# Patient Record
Sex: FEMALE | Race: WHITE | Employment: FULL TIME | URBAN - NONMETROPOLITAN AREA
[De-identification: names, ages, dates, MRNs, and addresses within clinical notes are randomized per-mention and may not be internally consistent; named-entity substitution may affect disease eponyms.]

---

## 2023-04-27 ENCOUNTER — HOSPITAL ENCOUNTER (EMERGENCY)
Age: 23
Discharge: HOME | End: 2023-04-27
Payer: COMMERCIAL

## 2023-04-27 VITALS
DIASTOLIC BLOOD PRESSURE: 74 MMHG | TEMPERATURE: 96.4 F | SYSTOLIC BLOOD PRESSURE: 121 MMHG | RESPIRATION RATE: 18 BRPM | OXYGEN SATURATION: 95 % | HEART RATE: 90 BPM

## 2023-04-27 DIAGNOSIS — M77.52 TENDONITIS OF ANKLE, LEFT: Primary | ICD-10-CM

## 2023-04-27 PROCEDURE — 99203 OFFICE O/P NEW LOW 30 MIN: CPT

## 2023-04-27 PROCEDURE — 99203 OFFICE O/P NEW LOW 30 MIN: CPT | Performed by: NURSE PRACTITIONER

## 2023-04-27 RX ORDER — PREDNISONE 20 MG/1
20 TABLET ORAL DAILY
Qty: 5 TABLET | Refills: 0 | Status: SHIPPED | OUTPATIENT
Start: 2023-04-27 | End: 2023-05-02

## 2023-04-27 RX ORDER — NORGESTIMATE AND ETHINYL ESTRADIOL 0.25-0.035
1 KIT ORAL DAILY
COMMUNITY

## 2023-04-27 ASSESSMENT — ENCOUNTER SYMPTOMS
VOMITING: 0
COUGH: 0
EYE PAIN: 0
RHINORRHEA: 0
ABDOMINAL PAIN: 0
SHORTNESS OF BREATH: 0
BLOOD IN STOOL: 0
NAUSEA: 0
WHEEZING: 0
DIARRHEA: 0
CONSTIPATION: 0

## 2023-04-27 ASSESSMENT — PAIN - FUNCTIONAL ASSESSMENT: PAIN_FUNCTIONAL_ASSESSMENT: 0-10

## 2023-04-27 ASSESSMENT — PAIN DESCRIPTION - ORIENTATION: ORIENTATION: LEFT;POSTERIOR

## 2023-04-27 ASSESSMENT — PAIN SCALES - GENERAL: PAINLEVEL_OUTOF10: 6

## 2023-04-27 ASSESSMENT — PAIN DESCRIPTION - LOCATION: LOCATION: FOOT

## 2023-04-27 NOTE — ED PROVIDER NOTES
[id number]       Chief Complaint   Patient presents with    Tendonitis    Foot Problem        Nurses Notes reviewed and I agree except as noted in the HPI. HISTORY OF PRESENT ILLNESS   Gloria Alves is a 23 y.o. female who presents to urgent care with complaint of left ankle pain and swelling that started while she was spending a significant amount of time on her feet 2 weeks ago. 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 She states that she was walking today down some steps and she felt a sharp pain in her Achilles tendon. She states that she is able to walk, but this does elicit some pain. Patient denies any injury or fall. Patient denies numbness or tingling. Patient is able to move the ankle and toes without difficulty. Dorsalis pedis and posterior tibialis pulses are 2+. Cap refill is less then 2 seconds at the nailbed. Sensation is intact to the toes. REVIEW OF SYSTEMS     Review of Systems   Constitutional:  Negative for appetite change, chills, fatigue, fever and unexpected weight change. HENT:  Negative for ear pain and rhinorrhea. Eyes:  Negative for pain and visual disturbance. Respiratory:  Negative for cough, shortness of breath and wheezing. Cardiovascular:  Negative for chest pain, palpitations and leg swelling. Gastrointestinal:  Negative for abdominal pain, blood in stool, constipation, diarrhea, nausea and vomiting. Genitourinary:  Negative for dysuria, frequency and hematuria. Musculoskeletal:  Positive for arthralgias (left ankle pain). Negative for joint swelling and neck stiffness. Skin:  Negative for rash. Neurological:  Negative for dizziness, syncope, weakness, light-headedness and headaches. Hematological:  Does not bruise/bleed easily.      PAST

## 2023-04-27 NOTE — ED TRIAGE NOTES
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

## 2024-02-07 ENCOUNTER — HOSPITAL ENCOUNTER (EMERGENCY)
Age: 24
Discharge: HOME | End: 2024-02-07
Payer: COMMERCIAL

## 2024-02-07 VITALS
TEMPERATURE: 98.9 F | DIASTOLIC BLOOD PRESSURE: 79 MMHG | RESPIRATION RATE: 22 BRPM | SYSTOLIC BLOOD PRESSURE: 114 MMHG | OXYGEN SATURATION: 99 % | HEART RATE: 94 BPM

## 2024-02-07 DIAGNOSIS — J10.1 INFLUENZA A: Primary | ICD-10-CM

## 2024-02-07 LAB
FLUAV AG SPEC QL: POSITIVE
FLUBV AG SPEC QL: NEGATIVE
S PYO AG THROAT QL: NEGATIVE

## 2024-02-07 PROCEDURE — 99213 OFFICE O/P EST LOW 20 MIN: CPT

## 2024-02-07 PROCEDURE — 99213 OFFICE O/P EST LOW 20 MIN: CPT | Performed by: NURSE PRACTITIONER

## 2024-02-07 PROCEDURE — 87804 INFLUENZA ASSAY W/OPTIC: CPT

## 2024-02-07 PROCEDURE — 87651 STREP A DNA AMP PROBE: CPT

## 2024-02-07 RX ORDER — BROMPHENIRAMINE MALEATE, PSEUDOEPHEDRINE HYDROCHLORIDE, AND DEXTROMETHORPHAN HYDROBROMIDE 2; 30; 10 MG/5ML; MG/5ML; MG/5ML
10 SYRUP ORAL 4 TIMES DAILY PRN
Qty: 200 ML | Refills: 0 | Status: SHIPPED | OUTPATIENT
Start: 2024-02-07

## 2024-02-07 ASSESSMENT — ENCOUNTER SYMPTOMS
EYES NEGATIVE: 1
SORE THROAT: 1
COUGH: 1

## 2024-02-07 ASSESSMENT — PAIN - FUNCTIONAL ASSESSMENT: PAIN_FUNCTIONAL_ASSESSMENT: NONE - DENIES PAIN

## 2024-02-07 NOTE — ED TRIAGE NOTES
Has been going on for about a month cough, runny nose/congestion, , and Sunday woke with sore throat, sinus congestion, tired, will need a work note

## 2024-02-07 NOTE — ED PROVIDER NOTES
[facility] URGENT CARE  UrgentCare Encounter      CHIEFCOMPLAINT     No chief complaint on file.      Nurses Notes reviewed and I agree except as noted in the HPI.  HISTORY OF PRESENT ILLNESS   Preethi Bates is a 23 y.o. female who presents to urgent care with complaints of fever, body aches, chills, cough.  Symptom onset was Sunday evening.    REVIEW OF SYSTEMS     Review of Systems   Constitutional:  Positive for chills, fatigue and fever.   HENT:  Positive for congestion and sore throat.    Eyes: Negative.    Respiratory:  Positive for cough.    Cardiovascular:  Negative for chest pain.   Musculoskeletal:  Positive for myalgias.       PAST MEDICAL HISTORY   History reviewed. No pertinent past medical history.    SURGICAL HISTORY     Patient  has a past surgical history that includes Ankle surgery and Myringotomy w/ tubes.    CURRENT MEDICATIONS       Previous Medications    NORGESTIMATE-ETHINYL ESTRADIOL (SPRINTEC 28) 0.25-35 MG-MCG PER TABLET    Take 1 tablet by mouth daily       ALLERGIES     Patient is has No Known Allergies.    FAMILY HISTORY     Patient'sfamily history is not on file.    SOCIAL HISTORY     Patient  reports that she has never smoked. She has never been exposed to tobacco smoke. She has never used smokeless tobacco. She reports current alcohol use.    PHYSICAL EXAM     ED TRIAGE VITALS  BP: 114/79, Temp: 98.9 °F (37.2 °C), Pulse: 94, Respirations: 22, SpO2: 99 %  Physical Exam  Vitals and nursing note reviewed.   Constitutional:       General: She is not in acute distress.     Appearance: Normal appearance. She is not ill-appearing or toxic-appearing.   HENT:      Head: Normocephalic and atraumatic.      Nose: No congestion or rhinorrhea.      Mouth/Throat:      Mouth: Mucous membranes are moist.      Pharynx: Oropharynx is clear.   Eyes:      Extraocular Movements: Extraocular movements intact.      Conjunctiva/sclera: Conjunctivae normal.      Pupils: Pupils are equal, round,

## 2024-02-07 NOTE — DISCHARGE INSTRUCTIONS
Continue acetaminophen and ibuprofen as needed for any fever, body aches, chills.  Rest.  Encourage oral fluid intake including water, gatorade, pedialyte.  Drink frequent small quantities.

## 2024-02-25 ENCOUNTER — HOSPITAL ENCOUNTER (EMERGENCY)
Age: 24
Discharge: HOME | End: 2024-02-25
Payer: COMMERCIAL

## 2024-02-25 VITALS
HEART RATE: 100 BPM | SYSTOLIC BLOOD PRESSURE: 124 MMHG | RESPIRATION RATE: 16 BRPM | TEMPERATURE: 97.8 F | DIASTOLIC BLOOD PRESSURE: 73 MMHG | OXYGEN SATURATION: 97 %

## 2024-02-25 DIAGNOSIS — N75.0 BARTHOLIN CYST: Primary | ICD-10-CM

## 2024-02-25 PROCEDURE — 99213 OFFICE O/P EST LOW 20 MIN: CPT

## 2024-02-25 RX ORDER — AMOXICILLIN AND CLAVULANATE POTASSIUM 875; 125 MG/1; MG/1
1 TABLET, FILM COATED ORAL 2 TIMES DAILY
Qty: 14 TABLET | Refills: 0 | Status: SHIPPED | OUTPATIENT
Start: 2024-02-25 | End: 2024-03-03

## 2024-02-25 RX ORDER — DOXYCYCLINE HYCLATE 100 MG
100 TABLET ORAL 2 TIMES DAILY
Qty: 14 TABLET | Refills: 0 | Status: SHIPPED | OUTPATIENT
Start: 2024-02-25 | End: 2024-03-03

## 2024-02-25 ASSESSMENT — PAIN DESCRIPTION - DESCRIPTORS: DESCRIPTORS: SHARP

## 2024-02-25 ASSESSMENT — PAIN DESCRIPTION - PAIN TYPE: TYPE: ACUTE PAIN

## 2024-02-25 ASSESSMENT — PAIN DESCRIPTION - FREQUENCY: FREQUENCY: CONTINUOUS

## 2024-02-25 ASSESSMENT — PAIN - FUNCTIONAL ASSESSMENT: PAIN_FUNCTIONAL_ASSESSMENT: 0-10

## 2024-02-25 ASSESSMENT — PAIN DESCRIPTION - LOCATION: LOCATION: VULVA

## 2024-02-25 ASSESSMENT — PAIN SCALES - GENERAL: PAINLEVEL_OUTOF10: 6

## 2024-02-25 NOTE — ED PROVIDER NOTES
[facility] URGENT CARE  Urgent Care Encounter      CHIEF COMPLAINT       Chief Complaint   Patient presents with    Abscess     Abscess to  left labia- started as a \"pimple\"  Wednesday and now the  entire labia is  swollen and painful       Nurses Notes reviewed and I agree except as noted in the HPI.  HISTORY OF PRESENT ILLNESS   Preethi Bates is a 23 y.o. female who presents to urgent care with complaints of abscess to left labia.  Patient reports she first noticed symptoms 4 days ago.  Patient reports it started out as a pimple and now her entire left labia is swollen and very painful.  Patient reports she has been taking Epsom salt baths and taking ibuprofen every 6 hours.  Patient reports she does not have an OB/GYN currently.  Patient denies fevers, denies chances of STI.  Denies pregnancy.    REVIEW OF SYSTEMS     Review of Systems   Constitutional:  Negative for fatigue and fever.   HENT:  Negative for congestion, sinus pain and sore throat.    Respiratory:  Negative for cough and shortness of breath.    Cardiovascular:  Negative for chest pain.   Gastrointestinal:  Negative for abdominal pain.   Genitourinary:  Positive for genital sores. Negative for dysuria and flank pain.   Skin:  Positive for wound.   Neurological:  Negative for dizziness and seizures.       PAST MEDICAL HISTORY   History reviewed. No pertinent past medical history.    SURGICAL HISTORY     Patient  has a past surgical history that includes Ankle surgery and Myringotomy w/ tubes.    CURRENT MEDICATIONS       Discharge Medication List as of 2/25/2024 11:42 AM        CONTINUE these medications which have NOT CHANGED    Details   brompheniramine-pseudoephedrine-DM 2-30-10 MG/5ML syrup Take 10 mLs by mouth 4 times daily as needed for Congestion or Cough, Disp-200 mL, R-0Normal      norgestimate-ethinyl estradiol (SPRINTEC 28) 0.25-35 MG-MCG per tablet Take 1 tablet by mouth dailyHistorical Med             ALLERGIES     Patient

## 2024-10-11 ENCOUNTER — HOSPITAL ENCOUNTER (EMERGENCY)
Age: 24
Discharge: HOME | End: 2024-10-11
Attending: EMERGENCY MEDICINE
Payer: COMMERCIAL

## 2024-10-11 VITALS
OXYGEN SATURATION: 98 % | HEART RATE: 110 BPM | RESPIRATION RATE: 16 BRPM | TEMPERATURE: 98.3 F | SYSTOLIC BLOOD PRESSURE: 122 MMHG | DIASTOLIC BLOOD PRESSURE: 90 MMHG

## 2024-10-11 DIAGNOSIS — F41.1 ANXIETY STATE: Primary | ICD-10-CM

## 2024-10-11 PROCEDURE — 99283 EMERGENCY DEPT VISIT LOW MDM: CPT

## 2024-10-11 RX ORDER — HYDROXYZINE HYDROCHLORIDE 25 MG/1
25 TABLET, FILM COATED ORAL EVERY 8 HOURS PRN
Qty: 15 TABLET | Refills: 0 | Status: SHIPPED | OUTPATIENT
Start: 2024-10-11 | End: 2024-10-16 | Stop reason: SDUPTHER

## 2024-10-11 ASSESSMENT — PATIENT HEALTH QUESTIONNAIRE - PHQ9
SUM OF ALL RESPONSES TO PHQ QUESTIONS 1-9: 19
1. LITTLE INTEREST OR PLEASURE IN DOING THINGS: MORE THAN HALF THE DAYS
SUM OF ALL RESPONSES TO PHQ QUESTIONS 1-9: 20
SUM OF ALL RESPONSES TO PHQ9 QUESTIONS 1 & 2: 4
6. FEELING BAD ABOUT YOURSELF - OR THAT YOU ARE A FAILURE OR HAVE LET YOURSELF OR YOUR FAMILY DOWN: MORE THAN HALF THE DAYS
9. THOUGHTS THAT YOU WOULD BE BETTER OFF DEAD, OR OF HURTING YOURSELF: SEVERAL DAYS
SUM OF ALL RESPONSES TO PHQ QUESTIONS 1-9: 20
SUM OF ALL RESPONSES TO PHQ QUESTIONS 1-9: 20
3. TROUBLE FALLING OR STAYING ASLEEP: NEARLY EVERY DAY
5. POOR APPETITE OR OVEREATING: MORE THAN HALF THE DAYS
10. IF YOU CHECKED OFF ANY PROBLEMS, HOW DIFFICULT HAVE THESE PROBLEMS MADE IT FOR YOU TO DO YOUR WORK, TAKE CARE OF THINGS AT HOME, OR GET ALONG WITH OTHER PEOPLE: EXTREMELY DIFFICULT
8. MOVING OR SPEAKING SO SLOWLY THAT OTHER PEOPLE COULD HAVE NOTICED. OR THE OPPOSITE, BEING SO FIGETY OR RESTLESS THAT YOU HAVE BEEN MOVING AROUND A LOT MORE THAN USUAL: NEARLY EVERY DAY
4. FEELING TIRED OR HAVING LITTLE ENERGY: NEARLY EVERY DAY
7. TROUBLE CONCENTRATING ON THINGS, SUCH AS READING THE NEWSPAPER OR WATCHING TELEVISION: MORE THAN HALF THE DAYS
2. FEELING DOWN, DEPRESSED OR HOPELESS: MORE THAN HALF THE DAYS

## 2024-10-11 ASSESSMENT — LIFESTYLE VARIABLES
HOW OFTEN DO YOU HAVE A DRINK CONTAINING ALCOHOL: NEVER
HOW MANY STANDARD DRINKS CONTAINING ALCOHOL DO YOU HAVE ON A TYPICAL DAY: PATIENT DOES NOT DRINK

## 2024-10-11 ASSESSMENT — SLEEP AND FATIGUE QUESTIONNAIRES
AVERAGE NUMBER OF SLEEP HOURS: 4
DO YOU USE A SLEEP AID: NO
SLEEP PATTERN: DIFFICULTY FALLING ASLEEP
DO YOU HAVE DIFFICULTY SLEEPING: YES

## 2024-10-12 NOTE — ED PROVIDER NOTES
[facility]  EMERGENCY DEPARTMENT ENCOUNTER          Pt Name: Preethi Bates  MRN: [id number]  Birthdate 2000  Date of evaluation: 10/11/2024    CHIEF COMPLAINT     No chief complaint on file.      Nurses Notes reviewed and I agree except as noted in the HPI.    HISTORY OF PRESENT ILLNESS    Preethi Bates is a 24 y.o. pleasant female who presents to the emergency department for evaluation of anxiety, depression.  Patient reports that she has had anxiety and depression since she was 16 years of age.  She has never been formally diagnosed but states that she has struggled with this for years.  She reports having \"a bad episode tonight in my room, I was thinking about things, I felt helpless and lost the will to live for a second\".  Denies any specific plan.  She reports that she has been feeling as though she has lost the will to live about once a month ever since April when she broke up with her boyfriend and started to experience some financial difficulties related to her college because.  Denies any past history of suicide attempts or cutting behavior.  Over the summer she was seeking counseling through [facility] which she reports helped her slightly but she did not continue with the counseling.  She denies current suicidal thoughts.  No homicidal ideation.  No auditory or visual hallucinations.  She uses marijuana 3 times a week.  Denies any current alcohol use.  She is a student at [employer].  She did have to stop her classes in April due to finances, has restarted her coursework but her grades are not doing well per her report currently.  The patient has no other acute complaints at this time.        REVIEW OF SYSTEMS   Review of Systems    PAST MEDICAL AND SURGICAL HISTORY   No past medical history on file.  Past Surgical History:   Procedure Laterality Date    ANKLE SURGERY      MYRINGOTOMY W/ TUBES         CURRENT MEDICATIONS   No current facility-administered medications for    Pertinent previous records reviewed:  previous notes, admissions and hospitalizations .  Code Status: Not addressed at time of initial evaluation and reviewed.    DIAGNOSTIC RESULTS       RADIOLOGY: non-plain film images(s) such as CT,Ultrasound and MRI are read by the radiologist.    No orders to display     [] Visualized and interpreted by me   [] Radiologist's Wet Read Report Reviewed   [] Discussed withRadiologist.    LABS:   Labs Reviewed - No data to display    (Any cultures that may have been sent were not resulted at the time of this patient visit)    ED Medications administered this visit: Medications - No data to display      ED COURSE        Available laboratory and imaging results were independently reviewed and clinically correlated.    CRITICAL CARE:   None    CONSULTS:  LURDES: Gabo assessed patient, no criteria for inpatient. Offered patient Mathieu assessment for CSU but she declines and prefers to pursue outpatient follow up with counseling/mental health services.    PROCEDURES:  None    Shared decision making was performed with the patient and/or present family.   Goals of care were discussed with patient and/or present family.      The results of pertinent diagnostic studies and exam findings were discussed. The patient’s provisional diagnosis and plan of care were discussed with the patient and present family. The patient and/or present family expressed understanding of the diagnosis and plan.     The nurse was instructed to provide written instructions and appropriate follow-up information. The patient understands their need and responsibility to obtain additional follow-up as instructed. The patient is comfortable with the plan and discharge. The risks of medications administered and prescribed were discussed with the patient and family present.      MEDICATION CHANGES     Discharge Medication List as of 10/11/2024 11:33 PM        START taking these medications    Details   hydrOXYzine HCl

## 2024-10-12 NOTE — ED NOTES
Patient presents to ED with chief complaint of Anxiety and depression. Patient states that she has been struggling with these symptoms since she was 16, but recently she went through a tough break up. Patient is also struggling to pay for college so this has contributed to the anxiety as well. Patient states that she has had thoughts of hurting herself, but states they are just thoughts, and she has no plan. Patient resting in bed. Respirations easy and unlabored. No distress noted. Call light within reach.

## 2024-10-12 NOTE — PROGRESS NOTES
[facility] CRISIS ASSESSMENT    SITUATION  Chief Complaint per ED Provider or Assigned Nurse report:   Depression, anxiety, thoughts of hurting self     Chief Complaint per Patient report  Anxiety, depression, suicidal ideation prior to arrival to ED     Chief Complaint per Collateral contact report (Identify who and if they are present with the patient or if contacted by phone)  Pt presented alone    If collateral was not obtained why (if obtained then NA):  Pt presented alone    Provisional Diagnosis (ICD or DSM approved diagnosis only) : Unspecified Depressive Disorder       BACKGROUND  Risk, Psychosocial and Contextual Factors: (EXAMPLE - homeless, lack of social support, lack of family, unemployed, debt, legal, etc.): Currently in College however grades are \"not great\", pt and her boyfriend broke up in April 2024, not linked to outpatient mental health treatment    Protective Factors:  Pt state her mother and step-father are supportive     Current MH Treatment: Denies       Past MH Treatment or Hospitalization (Previous 6 months):   Counseling during the summer, no history of inpatient psychiatric treatment        Present Suicidal Behavior (Include specific information below):      Verbal:  Denies     Attempt:  Denies     Access to Weapons:   Denies     Access to the Means of self harm or harm to others identified:   Denies      C-SSRS Current Suicide Risk: Low, Moderate or High:    Low       Past Suicidal Behavior (Include specific information below):       Verbal:  X    Attempts:   Denies     Self-Injurious/Self-Mutilation: (Specify what, how often, last time, method, etc.)   Denies     Traumatic Event Within Past 2 Weeks: (Specify)  Denies     Current Abuse:  (type, perpetrator, systems involved, injuries, etc.)  Denies       Legal Involvement: (Specify)   Denies     Violence: (Specify)   Denies     Housing:   Lives on campus at [employer] with sever roommates    CPAP/Oxygen/Ambulation Difficulties Provide pertinent

## 2024-10-16 ENCOUNTER — OFFICE VISIT (OUTPATIENT)
Dept: FAMILY MEDICINE CLINIC | Age: 24
End: 2024-10-16
Payer: COMMERCIAL

## 2024-10-16 VITALS
HEIGHT: 68 IN | OXYGEN SATURATION: 99 % | BODY MASS INDEX: 44.41 KG/M2 | HEART RATE: 86 BPM | RESPIRATION RATE: 16 BRPM | SYSTOLIC BLOOD PRESSURE: 130 MMHG | DIASTOLIC BLOOD PRESSURE: 86 MMHG | WEIGHT: 293 LBS | TEMPERATURE: 98.2 F

## 2024-10-16 DIAGNOSIS — E66.01 MORBID OBESITY: ICD-10-CM

## 2024-10-16 DIAGNOSIS — Z55.9 WORRIED ABOUT SCHOOL: ICD-10-CM

## 2024-10-16 DIAGNOSIS — F41.1 GAD (GENERALIZED ANXIETY DISORDER): Primary | ICD-10-CM

## 2024-10-16 DIAGNOSIS — F41.0 PANIC ATTACK: ICD-10-CM

## 2024-10-16 PROCEDURE — 99203 OFFICE O/P NEW LOW 30 MIN: CPT | Performed by: NURSE PRACTITIONER

## 2024-10-16 RX ORDER — PAROXETINE 10 MG/1
10 TABLET, FILM COATED ORAL DAILY
Qty: 30 TABLET | Refills: 3 | Status: SHIPPED | OUTPATIENT
Start: 2024-10-16

## 2024-10-16 RX ORDER — HYDROXYZINE HYDROCHLORIDE 25 MG/1
25 TABLET, FILM COATED ORAL 2 TIMES DAILY
Qty: 180 TABLET | Refills: 1 | Status: SHIPPED | OUTPATIENT
Start: 2024-10-16 | End: 2025-04-14

## 2024-10-16 SDOH — ECONOMIC STABILITY: FOOD INSECURITY: WITHIN THE PAST 12 MONTHS, THE FOOD YOU BOUGHT JUST DIDN'T LAST AND YOU DIDN'T HAVE MONEY TO GET MORE.: NEVER TRUE

## 2024-10-16 SDOH — ECONOMIC STABILITY: FOOD INSECURITY: WITHIN THE PAST 12 MONTHS, YOU WORRIED THAT YOUR FOOD WOULD RUN OUT BEFORE YOU GOT MONEY TO BUY MORE.: NEVER TRUE

## 2024-10-16 SDOH — ECONOMIC STABILITY: INCOME INSECURITY: HOW HARD IS IT FOR YOU TO PAY FOR THE VERY BASICS LIKE FOOD, HOUSING, MEDICAL CARE, AND HEATING?: NOT HARD AT ALL

## 2024-10-16 SDOH — EDUCATIONAL SECURITY - EDUCATION ATTAINMENT: PROBLEMS RELATED TO EDUCATION AND LITERACY, UNSPECIFIED: Z55.9

## 2024-10-16 ASSESSMENT — ENCOUNTER SYMPTOMS
RESPIRATORY NEGATIVE: 1
GASTROINTESTINAL NEGATIVE: 1

## 2024-10-16 NOTE — PROGRESS NOTES
distress.      Breath sounds: Normal breath sounds.   Abdominal:      General: Abdomen is flat. Bowel sounds are normal. There is no distension.      Palpations: Abdomen is soft.      Tenderness: There is no abdominal tenderness.   Skin:     General: Skin is warm and dry.      Capillary Refill: Capillary refill takes less than 2 seconds.   Neurological:      General: No focal deficit present.      Mental Status: She is alert and oriented to person, place, and time.   Psychiatric:         Attention and Perception: Attention normal.         Mood and Affect: Mood is anxious.         Speech: Speech normal.         Behavior: Behavior normal. Behavior is cooperative.         Thought Content: Thought content does not include homicidal or suicidal plan.         Cognition and Memory: Cognition normal.         Judgment: Judgment normal.          /Plan:     Assessment & Plan     1. SUMI (generalized anxiety disorder)  Not at goal but better continue atarax and add paxil  Risks and benefits and SE of med discussed   - hydrOXYzine HCl (ATARAX) 25 MG tablet; Take 1 tablet by mouth in the morning and at bedtime  Dispense: 180 tablet; Refill: 1  - PARoxetine (PAXIL) 10 MG tablet; Take 1 tablet by mouth daily  Dispense: 30 tablet; Refill: 3  - CBC with Auto Differential; Future  - Basic Metabolic Panel; Future  - TSH With Reflex Ft4; Future    2. Worried about school  -#1  Start counseling   - PARoxetine (PAXIL) 10 MG tablet; Take 1 tablet by mouth daily  Dispense: 30 tablet; Refill: 3  - CBC with Auto Differential; Future  - Basic Metabolic Panel; Future  - TSH With Reflex Ft4; Future    3. Panic attack  -#1  - PARoxetine (PAXIL) 10 MG tablet; Take 1 tablet by mouth daily  Dispense: 30 tablet; Refill: 3    4. Morbid obesity  Decrease calories 1500 a day  Increase physical activity 40 minutes a day     Pt in agreement with plan     Return in about 4 weeks (around 11/13/2024) for f/u Anxiety.    Reccommended tobaccocessation options

## 2024-10-18 ENCOUNTER — HOSPITAL ENCOUNTER (OUTPATIENT)
Age: 24
Discharge: HOME | End: 2024-10-18
Payer: COMMERCIAL

## 2024-10-18 DIAGNOSIS — F41.1 GAD (GENERALIZED ANXIETY DISORDER): ICD-10-CM

## 2024-10-18 DIAGNOSIS — Z55.9 WORRIED ABOUT SCHOOL: ICD-10-CM

## 2024-10-18 LAB
ANION GAP SERPL CALC-SCNC: 12 MEQ/L (ref 8–16)
BASOPHILS ABSOLUTE: 0 THOU/MM3 (ref 0–0.1)
BASOPHILS NFR BLD AUTO: 0.4 %
BUN SERPL-MCNC: 12 MG/DL (ref 7–22)
CALCIUM SERPL-MCNC: 9.1 MG/DL (ref 8.5–10.5)
CHLORIDE SERPL-SCNC: 103 MEQ/L (ref 98–111)
CO2 SERPL-SCNC: 23 MEQ/L (ref 23–33)
CREAT SERPL-MCNC: 0.6 MG/DL (ref 0.4–1.2)
DEPRECATED RDW RBC AUTO: 45.5 FL (ref 35–45)
EOSINOPHIL NFR BLD AUTO: 1.1 %
EOSINOPHILS ABSOLUTE: 0.1 THOU/MM3 (ref 0–0.4)
ERYTHROCYTE [DISTWIDTH] IN BLOOD BY AUTOMATED COUNT: 13.3 % (ref 11.5–14.5)
GFR SERPL CREATININE-BSD FRML MDRD: > 90 ML/MIN/1.73M2
GLUCOSE SERPL-MCNC: 92 MG/DL (ref 70–108)
HCT VFR BLD AUTO: 40.7 % (ref 37–47)
HGB BLD-MCNC: 13.2 GM/DL (ref 12–16)
IMM GRANULOCYTES # BLD AUTO: 0.02 THOU/MM3 (ref 0–0.07)
IMM GRANULOCYTES NFR BLD AUTO: 0.3 %
LYMPHOCYTES ABSOLUTE: 1.9 THOU/MM3 (ref 1–4.8)
LYMPHOCYTES NFR BLD AUTO: 26.9 %
MCH RBC QN AUTO: 30 PG (ref 26–33)
MCHC RBC AUTO-ENTMCNC: 32.4 GM/DL (ref 32.2–35.5)
MCV RBC AUTO: 92.5 FL (ref 81–99)
MONOCYTES ABSOLUTE: 0.5 THOU/MM3 (ref 0.4–1.3)
MONOCYTES NFR BLD AUTO: 6.9 %
NEUTROPHILS ABSOLUTE: 4.6 THOU/MM3 (ref 1.8–7.7)
NEUTROPHILS NFR BLD AUTO: 64.4 %
NRBC BLD AUTO-RTO: 0 /100 WBC
PLATELET # BLD AUTO: 283 THOU/MM3 (ref 130–400)
PMV BLD AUTO: 9.5 FL (ref 9.4–12.4)
POTASSIUM SERPL-SCNC: 4.1 MEQ/L (ref 3.5–5.2)
RBC # BLD AUTO: 4.4 MILL/MM3 (ref 4.2–5.4)
SODIUM SERPL-SCNC: 138 MEQ/L (ref 135–145)
TSH SERPL DL<=0.005 MIU/L-ACNC: 1.31 UIU/ML (ref 0.4–4.2)
WBC # BLD AUTO: 7.2 THOU/MM3 (ref 4.8–10.8)

## 2024-10-18 PROCEDURE — 36415 COLL VENOUS BLD VENIPUNCTURE: CPT

## 2024-10-18 PROCEDURE — 80048 BASIC METABOLIC PNL TOTAL CA: CPT

## 2024-10-18 PROCEDURE — 85025 COMPLETE CBC W/AUTO DIFF WBC: CPT

## 2024-10-18 PROCEDURE — 84443 ASSAY THYROID STIM HORMONE: CPT

## 2024-10-18 SDOH — EDUCATIONAL SECURITY - EDUCATION ATTAINMENT: PROBLEMS RELATED TO EDUCATION AND LITERACY, UNSPECIFIED: Z55.9

## 2024-10-21 ENCOUNTER — TELEPHONE (OUTPATIENT)
Dept: FAMILY MEDICINE CLINIC | Age: 24
End: 2024-10-21

## 2024-10-21 NOTE — TELEPHONE ENCOUNTER
----- Message from LATRICIA Rodriguez - CNP sent at 10/21/2024  8:39 AM EDT -----  Let pt know labs are stable and in appropriate range

## 2024-10-21 NOTE — TELEPHONE ENCOUNTER
Left detailed message regarding results. Okay per HIPAA. Requested call back at [phone]  if they have any further questions.

## 2024-11-07 DIAGNOSIS — Z55.9 WORRIED ABOUT SCHOOL: ICD-10-CM

## 2024-11-07 DIAGNOSIS — F41.0 PANIC ATTACK: ICD-10-CM

## 2024-11-07 DIAGNOSIS — F41.1 GAD (GENERALIZED ANXIETY DISORDER): ICD-10-CM

## 2024-11-07 RX ORDER — PAROXETINE 10 MG/1
10 TABLET, FILM COATED ORAL DAILY
Qty: 90 TABLET | Refills: 2 | Status: SHIPPED | OUTPATIENT
Start: 2024-11-07

## 2024-11-07 SDOH — EDUCATIONAL SECURITY - EDUCATION ATTAINMENT: PROBLEMS RELATED TO EDUCATION AND LITERACY, UNSPECIFIED: Z55.9

## 2024-11-07 NOTE — TELEPHONE ENCOUNTER
Recent Visits  Date Type Provider Dept   10/16/24 Office Visit Harjit Beltrán APRN - CNP [facility]   Showing recent visits within past 540 days with a meds authorizing provider and meeting all other requirements  Future Appointments  Date Type Provider Dept   11/15/24 Appointment Harjit Beltrán APRN - CNP [facility]   Showing future appointments within next 150 days with a meds authorizing provider and meeting all other requirements

## 2024-11-15 ENCOUNTER — OFFICE VISIT (OUTPATIENT)
Dept: FAMILY MEDICINE CLINIC | Age: 24
End: 2024-11-15

## 2024-11-15 VITALS
BODY MASS INDEX: 44.41 KG/M2 | SYSTOLIC BLOOD PRESSURE: 128 MMHG | DIASTOLIC BLOOD PRESSURE: 82 MMHG | HEART RATE: 82 BPM | TEMPERATURE: 98.2 F | OXYGEN SATURATION: 98 % | RESPIRATION RATE: 16 BRPM | WEIGHT: 293 LBS | HEIGHT: 68 IN

## 2024-11-15 DIAGNOSIS — F41.9 ANXIETY: Primary | ICD-10-CM

## 2024-11-15 DIAGNOSIS — Z23 IMMUNIZATION DUE: ICD-10-CM

## 2024-11-15 NOTE — PROGRESS NOTES
[facility]  [address] [place] [address]  Dept: [phone]  Dept Fax: [phone]  Loc: [phone]    Preethi Bates is a 24 y.o. femalewho presents today for her medical conditions/complaints as noted below.  Preethi Trujillo c/o of Follow-up (Anxiety is doing better, Paxil is working well) and Health Maintenance (No recent pap)      :     HPI      PMH: anxiety      On OCP's    Recently went to ER Dx with anxiety started on atarax and here for f/u     Anxiety  -pt is a student at [employer]  -moved here from [facility]   -studying marketing and Sports Management  -had started feeling overwhelmed,   -had to drop out a semester due to funds, working part time at [employer]  -failed summer semester  -having panic attacks and feeling stress, down sad and tearful  -never took meds for anxiety or depression in the past  -family Hx of anxiety dn bipolar   -states atarax has helped her be less anxious wants to stay on it  -No SE  -going to start counseling with through  mothers employment    -taking paxil 10 mg daily states it is working well no longer having panic attacks, continues to take atarax prn   Feels happier  School going well, may move home for 1 quarter to save money       Lab Results   Component Value Date    WBC 7.2 10/18/2024    HGB 13.2 10/18/2024    HCT 40.7 10/18/2024    MCV 92.5 10/18/2024     10/18/2024      Lab Results   Component Value Date/Time     10/18/2024 11:37 AM    K 4.1 10/18/2024 11:37 AM     10/18/2024 11:37 AM    CO2 23 10/18/2024 11:37 AM    BUN 12 10/18/2024 11:37 AM    CREATININE 0.6 10/18/2024 11:37 AM    GLUCOSE 92 10/18/2024 11:37 AM    CALCIUM 9.1 10/18/2024 11:37 AM    LABGLOM > 90 10/18/2024 11:37 AM      Lab Results   Component Value Date    TSH 1.310 10/18/2024      Current Outpatient Medications   Medication Sig Dispense Refill    PARoxetine (PAXIL) 10 MG tablet TAKE 1 TABLET BY MOUTH EVERY

## 2024-11-15 NOTE — PROGRESS NOTES
Vaccine Information Sheet, \"Influenza - Inactivated\"  given to Preethi Bates, or parent/legal guardian of  Preethi Bates and verbalized understanding.    Patient responses:    Have you ever had a reaction to a flu vaccine? No  Do you have an allergy to eggs, neomycin or polymixin?  No  Do you have an allergy to Thimerosal, contact lens solution, or Merthiolate? No  Have you ever had Guillian Devers Syndrome?  No  Do you have any current illness?  No  Do you have a temperature above 100 degrees? No  Are you pregnant? No  If pregnant, permission obtained from physician? No  Do you have an active neurological disorder? No      Flu vaccine given per order. Please see immunization tab.    Immunization(s) given during visit:    Immunizations Administered       Name Date Dose Route    Influenza, FLUCELVAX, (age 6 mo+) IM, Trivalent PF, 0.5mL 11/15/2024 0.5 mL Intramuscular    Site: Deltoid- Right    Lot: [id number]    NDC: 52471-646-52            Most recent Vaccine Information Sheet dated 8.6.2021 given to pt

## 2025-02-17 ENCOUNTER — HOSPITAL ENCOUNTER (OUTPATIENT)
Age: 25
Discharge: HOME | End: 2025-02-17
Payer: COMMERCIAL

## 2025-02-17 ENCOUNTER — OFFICE VISIT (OUTPATIENT)
Dept: FAMILY MEDICINE CLINIC | Age: 25
End: 2025-02-17
Payer: COMMERCIAL

## 2025-02-17 VITALS
BODY MASS INDEX: 44.41 KG/M2 | HEART RATE: 79 BPM | DIASTOLIC BLOOD PRESSURE: 82 MMHG | OXYGEN SATURATION: 98 % | WEIGHT: 293 LBS | TEMPERATURE: 98.3 F | SYSTOLIC BLOOD PRESSURE: 128 MMHG | HEIGHT: 68 IN | RESPIRATION RATE: 18 BRPM

## 2025-02-17 DIAGNOSIS — F41.1 GAD (GENERALIZED ANXIETY DISORDER): ICD-10-CM

## 2025-02-17 DIAGNOSIS — Z01.84 IMMUNITY STATUS TESTING: ICD-10-CM

## 2025-02-17 DIAGNOSIS — Z02.89 ENCOUNTER FOR PHYSICAL EXAMINATION RELATED TO EMPLOYMENT: Primary | ICD-10-CM

## 2025-02-17 DIAGNOSIS — Z23 IMMUNIZATION DUE: ICD-10-CM

## 2025-02-17 DIAGNOSIS — F41.9 ANXIETY: ICD-10-CM

## 2025-02-17 DIAGNOSIS — Z02.89 ENCOUNTER FOR PHYSICAL EXAMINATION RELATED TO EMPLOYMENT: ICD-10-CM

## 2025-02-17 PROCEDURE — 86765 RUBEOLA ANTIBODY: CPT

## 2025-02-17 PROCEDURE — 86735 MUMPS ANTIBODY: CPT

## 2025-02-17 PROCEDURE — 99214 OFFICE O/P EST MOD 30 MIN: CPT | Performed by: NURSE PRACTITIONER

## 2025-02-17 PROCEDURE — 86644 CMV ANTIBODY: CPT

## 2025-02-17 PROCEDURE — 90715 TDAP VACCINE 7 YRS/> IM: CPT | Performed by: NURSE PRACTITIONER

## 2025-02-17 PROCEDURE — 36415 COLL VENOUS BLD VENIPUNCTURE: CPT

## 2025-02-17 PROCEDURE — 86694 HERPES SIMPLEX NES ANTBDY: CPT

## 2025-02-17 PROCEDURE — 86762 RUBELLA ANTIBODY: CPT

## 2025-02-17 PROCEDURE — 86777 TOXOPLASMA ANTIBODY: CPT

## 2025-02-17 PROCEDURE — 90471 IMMUNIZATION ADMIN: CPT | Performed by: NURSE PRACTITIONER

## 2025-02-17 RX ORDER — HYDROXYZINE HYDROCHLORIDE 25 MG/1
25 TABLET, FILM COATED ORAL 2 TIMES DAILY
Qty: 180 TABLET | Refills: 1 | Status: SHIPPED | OUTPATIENT
Start: 2025-02-17 | End: 2025-08-16

## 2025-02-17 SDOH — ECONOMIC STABILITY: FOOD INSECURITY: WITHIN THE PAST 12 MONTHS, THE FOOD YOU BOUGHT JUST DIDN'T LAST AND YOU DIDN'T HAVE MONEY TO GET MORE.: NEVER TRUE

## 2025-02-17 SDOH — ECONOMIC STABILITY: FOOD INSECURITY: WITHIN THE PAST 12 MONTHS, YOU WORRIED THAT YOUR FOOD WOULD RUN OUT BEFORE YOU GOT MONEY TO BUY MORE.: NEVER TRUE

## 2025-02-17 ASSESSMENT — PATIENT HEALTH QUESTIONNAIRE - PHQ9
SUM OF ALL RESPONSES TO PHQ QUESTIONS 1-9: 0
SUM OF ALL RESPONSES TO PHQ QUESTIONS 1-9: 0
SUM OF ALL RESPONSES TO PHQ9 QUESTIONS 1 & 2: 0
2. FEELING DOWN, DEPRESSED OR HOPELESS: NOT AT ALL
SUM OF ALL RESPONSES TO PHQ QUESTIONS 1-9: 0
1. LITTLE INTEREST OR PLEASURE IN DOING THINGS: NOT AT ALL
SUM OF ALL RESPONSES TO PHQ QUESTIONS 1-9: 0

## 2025-02-17 NOTE — PROGRESS NOTES
[facility]  [address] [place] [address]  Dept: [phone]  Dept Fax: [phone]  Loc: [phone]    Preethi Bates is a 24 y.o. femalewho presents today for her medical conditions/complaints as noted below.  Preethi Trujillo c/o of Follow-up      :     HPI      PMH: anxiety      On OCP's    Pt here for employment Physical to work nI a .     Pt eats a wide variety of foods  Sleeping well  No formal exercise program   No immunization records available.   Does not need TB testing.     Anxiety  -pt is a student at [employer]  -moved here from [place] [facility]   -studying marketing and Sports Management  -working at a   -had started feeling overwhelmed,   -had to drop out a semester due to funds, working part time at [employer]  -failed summer semester  -not having panic attacks any longer, and feeling less stress, not down sad and tearful any longer  -family Hx of anxiety dn bipolar   -states atarax has helped her be less anxious wants to stay on it  -No SE  -going to start counseling with through  mothers employment    -taking paxil 10 mg daily states it is working well no longer having panic attacks, continues to take atarax prn   Feels happier        Lab Results   Component Value Date    WBC 7.2 10/18/2024    HGB 13.2 10/18/2024    HCT 40.7 10/18/2024    MCV 92.5 10/18/2024     10/18/2024      Lab Results   Component Value Date/Time     10/18/2024 11:37 AM    K 4.1 10/18/2024 11:37 AM     10/18/2024 11:37 AM    CO2 23 10/18/2024 11:37 AM    BUN 12 10/18/2024 11:37 AM    CREATININE 0.6 10/18/2024 11:37 AM    GLUCOSE 92 10/18/2024 11:37 AM    CALCIUM 9.1 10/18/2024 11:37 AM    LABGLOM > 90 10/18/2024 11:37 AM      Lab Results   Component Value Date    TSH 1.310 10/18/2024       Current Outpatient Medications   Medication Sig Dispense Refill    hydrOXYzine HCl (ATARAX) 25 MG tablet Take 1 tablet by mouth in the morning and

## 2025-02-17 NOTE — PROGRESS NOTES
Immunization(s) given during visit:    Immunizations Administered       Name Date Dose Route    TDaP, ADACEL (age 10y-64y), BOOSTRIX (age 10y+), IM, 0.5mL 2/17/2025 0.5 mL Intramuscular    Site: Deltoid- Right    Lot:     NDC: 71505-875-66            Most recent Vaccine Information Sheet dated 8/6/21 given to pt

## 2025-02-19 ENCOUNTER — TELEPHONE (OUTPATIENT)
Dept: FAMILY MEDICINE CLINIC | Age: 25
End: 2025-02-19

## 2025-02-19 LAB
MEV IGG SER-ACNC: 134 AU/ML
MUV IGG TITR SER: 0.38 {TITER}
TORCH PANEL IGG: NORMAL

## 2025-02-19 NOTE — TELEPHONE ENCOUNTER
Pt informed of message  . Pt voiced understanding with no further questions at this time.       Future Appointments   Date Time Provider Department Center   2/26/2025 11:00 AM SCHEDULE, NURSE [facility]

## 2025-02-19 NOTE — TELEPHONE ENCOUNTER
----- Message from Harjit Beltrán, LATRICIA - CNP sent at 2/19/2025 12:15 PM EST -----  Let pt know her measles, and rubella titers show immunity but her mumps titer does not therefore she would need to complete an MMR series, we can give that to her here or if she has a Health dept. In her area she can get the vaccine there since she lives further away. And then send us info o the vaccine or we can reach out to her health dept to get the records so I can finish filling out her form. . Let me know her thoughts.

## 2025-02-26 ENCOUNTER — NURSE ONLY (OUTPATIENT)
Dept: FAMILY MEDICINE CLINIC | Age: 25
End: 2025-02-26
Payer: COMMERCIAL

## 2025-02-26 DIAGNOSIS — Z02.89 ENCOUNTER FOR PHYSICAL EXAMINATION RELATED TO EMPLOYMENT: Primary | ICD-10-CM

## 2025-02-26 PROCEDURE — 90471 IMMUNIZATION ADMIN: CPT | Performed by: NURSE PRACTITIONER

## 2025-02-26 PROCEDURE — 90707 MMR VACCINE SC: CPT | Performed by: NURSE PRACTITIONER

## 2025-02-26 NOTE — PROGRESS NOTES
Immunization(s) given during visit:    Immunizations Administered       Name Date Dose Route    MMR, PRIORIX, M-M-R II, (age 12m+), SC, 0.5mL 2/26/2025 0.5 mL Subcutaneous    Site: Right arm    Lot: [id number]    NDC: 5282-5334-84            Most recent Vaccine Information Sheet dated 8/6/21 given to pt

## 2025-08-15 DIAGNOSIS — F41.0 PANIC ATTACK: ICD-10-CM

## 2025-08-15 DIAGNOSIS — F41.1 GAD (GENERALIZED ANXIETY DISORDER): ICD-10-CM

## 2025-08-15 DIAGNOSIS — Z55.9 WORRIED ABOUT SCHOOL: ICD-10-CM

## 2025-08-15 RX ORDER — PAROXETINE 10 MG/1
10 TABLET, FILM COATED ORAL DAILY
Qty: 90 TABLET | Refills: 2 | Status: SHIPPED | OUTPATIENT
Start: 2025-08-15

## 2025-08-15 SDOH — EDUCATIONAL SECURITY - EDUCATION ATTAINMENT: PROBLEMS RELATED TO EDUCATION AND LITERACY, UNSPECIFIED: Z55.9
